# Patient Record
(demographics unavailable — no encounter records)

---

## 2025-03-06 NOTE — ASSESSMENT
[FreeTextEntry1] : CPE OF 48 Y OLD MALE = LABS AND EKG  TONYA HA TRIGGER BY COUGH ,SNEEZING OR LAUGHING = NEURO EVAL

## 2025-03-06 NOTE — ASSESSMENT
[FreeTextEntry1] : Patient with recurrent right testicular pain which is worse with standing Scrotal ultrasonography today reveals no testicular abnormalities.  This is consistent with prior ultrasound.  He continues to complain of pain which is worse with standing and resolves with sitting.  In light of the fact that there are no ultrasound findings and pain is persistent we discussed proceeding with a CT scan to rule out the presence of a inguinal hernia.  The patient wished to proceed.d with CT  The patient complains of sexual dysfunction with decreased rigidity and ability to maintain the erection.  He continues to be able to be sexually active.  He previously was given Cialis with good response to 5 mg.  He wishes to continue with this intervention.  We discussed generic Cialis from [facility] and we will proceed with this. He has maintained libido.  We discussed prior low normal testosterone  He will be seen in 1 month to assess his progress and review his CT scan .  4.28.2021 doing well on tadalafil no further pain considering vasectomy but wishes to defer labs reported as normal followup in 6 weeks  8.11.2021 Mr. Dumont presents today with gross hematuria.  He states this occurred with sexual stimulation.  However he had no hematospermia.  This is happened several occasions.  The entire urinary stream is bloody.  Although the initial part is bloodier.  He recently had a CT scan done at [facility] for abdominal pain.  This was reported as being normal.  We will obtain this for my review.  At this point he will have a urine culture.  We plan to proceed with outpatient cystoscopy.  The procedure was discussed with the patient.  He expressed understanding.  Relating to his sexual dysfunction.  He is doing well with tadalafil.  He is pleased.   Plan.:   #1 urine culture  #2 review CD outpatient CT  #3 outpatient cystoscopy   5.11.2022  s/p vasectomy  doing well no discomfort etc warned re continued contraception instruction again reviewed  concerned re STI exposure girl friiend ? discharge  in 12/2021 patient concerned  no symptoms no other partners  3.12.2024 returns complaining of scrotal pain when touched taking tadalafil 5 mg prn generally erection patient notes scrotal pain when standing but goes awary with sitting no effect from vasectomy pleased with effective  Nocturia variable no diurnil symptoms flow 9.5 cc/sec; PVR 0  no findings on exam discussed proceed [facility] ultrasound   patient wants STI testing including  Discussed PSA monitoring and controversy.  Patient wants to proceed,   Plan STI testing PSA ultrasound re scrotum renewal tadalafil  4.8.2024 patient with long history of testicular pain not painful witih exercise improved with stretching ultrasound normal Options: 1. observe 2. amitriptyline 3. physical therapy discussed history relief with stretching and pelvic floor dysfunction   3.6.2025 complain of testicular pain only when standing still  not with exercised etc s/p  pelvic floor discussed  PMR consult  Sexaul dysfunctioin rarely needs PD5  Plan: PSA  Discussed PSA monitoring and controversy.  Patient wants to proceed,  amitriptyline +/- referral to PMR for evalation  renewal tadalafil  nocturia flow 17.1 cc/sec; 120 PVR  3  followup in 1 year or prn

## 2025-03-06 NOTE — PHYSICAL EXAM
[No Acute Distress] : no acute distress [Normal Sclera/Conjunctiva] : normal sclera/conjunctiva [Normal Outer Ear/Nose] : the outer ears and nose were normal in appearance [Normal] : normal rate, regular rhythm, normal S1 and S2 and no murmur heard [No Edema] : there was no peripheral edema [Soft] : abdomen soft [Non Tender] : non-tender [Normal Bowel Sounds] : normal bowel sounds [Normal Anterior Cervical Nodes] : no anterior cervical lymphadenopathy [No CVA Tenderness] : no CVA  tenderness [Coordination Grossly Intact] : coordination grossly intact [No Focal Deficits] : no focal deficits [Alert and Oriented x3] : oriented to person, place, and time

## 2025-03-06 NOTE — PHYSICAL EXAM
[Urethral Meatus] : meatus normal [Penis Abnormality] : normal uncircumcised penis [Epididymis] : the epididymides were normal [Testes Mass (___cm)] : there were no testicular masses [de-identified] : skin tag right hemiscrotum

## 2025-03-06 NOTE — HISTORY OF PRESENT ILLNESS
[de-identified] : COMES FOR CPE  CC OF SEVERE TONYA FERRER WHEN LAUGHING ,SNEEZING OR COUGHING ;THIS HAS BEEN HAPPENING FOR 2 Y AND NEVER DISCUSSED OR EVALUATED SEEN BY KIMANI HINKLE MD IN [place] 6 WEEKS AGO AND RX PHENTERMINE 30 MG FOR 2 M ;HAD LOST ABOUT 12 LBS SO FAR

## 2025-03-06 NOTE — HISTORY OF PRESENT ILLNESS
[FreeTextEntry1] : 42 year old  complaining of right testicular pain worse when standing gets better when sit or stretch seen by Dr Pinedo several years ago. Choose not to return ultrasound was santino  also complaining of erectile dysfunction better erections with cialis he always able to complete sexual intercourse without   4.28.2021 testicular pain resolved playing sports without difficulty rarely discomfort if standing for prolonged time  continues to complain of erectile dysfunction  more confident with tadalafil 5 mg taking 1 - 2 x per week  8.11.2021 patient with hematuria associated with arousal gross hematuria total although initial portion of stream  not painful  no dysuria  12.17.2021 4.27.2022 vasectomy  5.11.2022 s/p vasectomy concerned RE STI  3.12.2024  4.8.2024 complaining of testicular pain not related to vasectomy variable in nature no pain when playing sports  3.5.2025 nocturia  denies Durmil symptoms testicular pain (r)   patient concerned that his short leg  pain is only when standing still on feet no pain with exercise no pain with playing

## 2025-05-28 NOTE — ASSESSMENT
[FreeTextEntry1] : Patient with recurrent right testicular pain which is worse with standing Scrotal ultrasonography today reveals no testicular abnormalities.  This is consistent with prior ultrasound.  He continues to complain of pain which is worse with standing and resolves with sitting.  In light of the fact that there are no ultrasound findings and pain is persistent we discussed proceeding with a CT scan to rule out the presence of a inguinal hernia.  The patient wished to proceed.d with CT  The patient complains of sexual dysfunction with decreased rigidity and ability to maintain the erection.  He continues to be able to be sexually active.  He previously was given Cialis with good response to 5 mg.  He wishes to continue with this intervention.  We discussed generic Cialis from [facility] and we will proceed with this. He has maintained libido.  We discussed prior low normal testosterone  He will be seen in 1 month to assess his progress and review his CT scan .  4.28.2021 doing well on tadalafil no further pain considering vasectomy but wishes to defer labs reported as normal followup in 6 weeks  8.11.2021 Mr. Dumont presents today with gross hematuria.  He states this occurred with sexual stimulation.  However he had no hematospermia.  This is happened several occasions.  The entire urinary stream is bloody.  Although the initial part is bloodier.  He recently had a CT scan done at [facility] for abdominal pain.  This was reported as being normal.  We will obtain this for my review.  At this point he will have a urine culture.  We plan to proceed with outpatient cystoscopy.  The procedure was discussed with the patient.  He expressed understanding.  Relating to his sexual dysfunction.  He is doing well with tadalafil.  He is pleased.   Plan.:   #1 urine culture  #2 review CD outpatient CT  #3 outpatient cystoscopy   5.11.2022  s/p vasectomy  doing well no discomfort etc warned re continued contraception instruction again reviewed  concerned re STI exposure girl friiend ? discharge  in 12/2021 patient concerned  no symptoms no other partners  3.12.2024 returns complaining of scrotal pain when touched taking tadalafil 5 mg prn generally erection patient notes scrotal pain when standing but goes awary with sitting no effect from vasectomy pleased with effective  Nocturia variable no diurnil symptoms flow 9.5 cc/sec; PVR 0  no findings on exam discussed proceed [facility] ultrasound   patient wants STI testing including  Discussed PSA monitoring and controversy.  Patient wants to proceed,   Plan STI testing PSA ultrasound re scrotum renewal tadalafil  4.8.2024 patient with long history of testicular pain not painful witih exercise improved with stretching ultrasound normal Options: 1. observe 2. amitriptyline 3. physical therapy discussed history relief with stretching and pelvic floor dysfunction   3.6.2025 complain of testicular pain only when standing still  not with exercised etc s/p  pelvic floor discussed  PMR consult  Sexaul dysfunctioin rarely needs PD5  Plan: PSA  Discussed PSA monitoring and controversy.  Patient wants to proceed,  amitriptyline +/- referral to PMR for evalation  renewal tadalafil  nocturia flow 17.1 cc/sec; 120 PVR  3  follow-up in 1 year or prn  5.28.2025 patient with evidence of balanitis wife has ? discharge wife a physician and treated herself ? Monistat  discussed elective circumcision not interested as he has not had problem in past  plan: mycology/nystatin hygiene fu prn

## 2025-05-28 NOTE — HISTORY OF PRESENT ILLNESS
[FreeTextEntry1] : 42 year old  complaining of right testicular pain worse when standing gets better when sit or stretch seen by Dr Pinedo several years ago. Choose not to return ultrasound was santino  also complaining of erectile dysfunction better erections with cialis he always able to complete sexual intercourse without   4.28.2021 testicular pain resolved playing sports without difficulty rarely discomfort if standing for prolonged time  continues to complain of erectile dysfunction  more confident with tadalafil 5 mg taking 1 - 2 x per week  8.11.2021 patient with hematuria associated with arousal gross hematuria total although initial portion of stream  not painful  no dysuria  12.17.2021 4.27.2022 vasectomy  5.11.2022 s/p vasectomy concerned RE STI  3.12.2024  4.8.2024 complaining of testicular pain not related to vasectomy variable in nature no pain when playing sports  3.5.2025 nocturia  denies Durmil symptoms testicular pain (r)   patient concerned that his short leg  pain is only when standing still on feet no pain with exercise no pain with playing   5.28.2025 complaining of penile lesion since 5.9.2025 not painful no vesicle patient states increased in size no other sexual partner complaininig of mild uirnary urgency